# Patient Record
Sex: MALE | Race: WHITE
[De-identification: names, ages, dates, MRNs, and addresses within clinical notes are randomized per-mention and may not be internally consistent; named-entity substitution may affect disease eponyms.]

---
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
## 2018-03-25 ENCOUNTER — HOSPITAL ENCOUNTER (OUTPATIENT)
Dept: HOSPITAL 17 - NEPC | Age: 58
Setting detail: OBSERVATION
LOS: 1 days | Discharge: HOME | End: 2018-03-26
Attending: HOSPITALIST | Admitting: HOSPITALIST
Payer: COMMERCIAL

## 2018-03-25 VITALS
OXYGEN SATURATION: 98 % | SYSTOLIC BLOOD PRESSURE: 164 MMHG | TEMPERATURE: 97.7 F | HEART RATE: 84 BPM | DIASTOLIC BLOOD PRESSURE: 116 MMHG | RESPIRATION RATE: 18 BRPM

## 2018-03-25 VITALS
TEMPERATURE: 98.2 F | SYSTOLIC BLOOD PRESSURE: 133 MMHG | DIASTOLIC BLOOD PRESSURE: 80 MMHG | OXYGEN SATURATION: 98 % | HEART RATE: 84 BPM | RESPIRATION RATE: 20 BRPM

## 2018-03-25 VITALS
OXYGEN SATURATION: 99 % | SYSTOLIC BLOOD PRESSURE: 137 MMHG | RESPIRATION RATE: 19 BRPM | HEART RATE: 79 BPM | DIASTOLIC BLOOD PRESSURE: 72 MMHG

## 2018-03-25 VITALS
RESPIRATION RATE: 18 BRPM | DIASTOLIC BLOOD PRESSURE: 80 MMHG | HEART RATE: 75 BPM | TEMPERATURE: 98.1 F | SYSTOLIC BLOOD PRESSURE: 143 MMHG | OXYGEN SATURATION: 98 %

## 2018-03-25 VITALS
RESPIRATION RATE: 19 BRPM | DIASTOLIC BLOOD PRESSURE: 87 MMHG | OXYGEN SATURATION: 99 % | SYSTOLIC BLOOD PRESSURE: 139 MMHG | HEART RATE: 78 BPM

## 2018-03-25 VITALS — BODY MASS INDEX: 31.56 KG/M2 | WEIGHT: 220.46 LBS | HEIGHT: 70 IN

## 2018-03-25 DIAGNOSIS — Z79.899: ICD-10-CM

## 2018-03-25 DIAGNOSIS — R41.3: ICD-10-CM

## 2018-03-25 DIAGNOSIS — L03.112: ICD-10-CM

## 2018-03-25 DIAGNOSIS — E11.9: ICD-10-CM

## 2018-03-25 DIAGNOSIS — F17.200: ICD-10-CM

## 2018-03-25 DIAGNOSIS — L02.412: ICD-10-CM

## 2018-03-25 DIAGNOSIS — R94.31: ICD-10-CM

## 2018-03-25 DIAGNOSIS — R55: ICD-10-CM

## 2018-03-25 DIAGNOSIS — R07.89: Primary | ICD-10-CM

## 2018-03-25 DIAGNOSIS — M79.605: ICD-10-CM

## 2018-03-25 DIAGNOSIS — R42: ICD-10-CM

## 2018-03-25 DIAGNOSIS — F31.77: ICD-10-CM

## 2018-03-25 DIAGNOSIS — E78.00: ICD-10-CM

## 2018-03-25 DIAGNOSIS — F41.9: ICD-10-CM

## 2018-03-25 DIAGNOSIS — R06.02: ICD-10-CM

## 2018-03-25 DIAGNOSIS — Z79.82: ICD-10-CM

## 2018-03-25 DIAGNOSIS — R00.1: ICD-10-CM

## 2018-03-25 LAB
ALBUMIN SERPL-MCNC: 3.8 GM/DL (ref 3.4–5)
ALP SERPL-CCNC: 67 U/L (ref 45–117)
ALT SERPL-CCNC: 21 U/L (ref 12–78)
AST SERPL-CCNC: 23 U/L (ref 15–37)
BASOPHILS # BLD AUTO: 0 TH/MM3 (ref 0–0.2)
BASOPHILS NFR BLD: 0.3 % (ref 0–2)
BILIRUB SERPL-MCNC: 0.4 MG/DL (ref 0.2–1)
BUN SERPL-MCNC: 6 MG/DL (ref 7–18)
CALCIUM SERPL-MCNC: 9 MG/DL (ref 8.5–10.1)
CHLORIDE SERPL-SCNC: 100 MEQ/L (ref 98–107)
COLOR UR: (no result)
CREAT SERPL-MCNC: 0.98 MG/DL (ref 0.6–1.3)
EOSINOPHIL # BLD: 0.1 TH/MM3 (ref 0–0.4)
EOSINOPHIL NFR BLD: 1.5 % (ref 0–4)
ERYTHROCYTE [DISTWIDTH] IN BLOOD BY AUTOMATED COUNT: 14.4 % (ref 11.6–17.2)
GFR SERPLBLD BASED ON 1.73 SQ M-ARVRAT: 79 ML/MIN (ref 89–?)
GLUCOSE SERPL-MCNC: 107 MG/DL (ref 74–106)
GLUCOSE UR STRIP-MCNC: (no result) MG/DL
HCO3 BLD-SCNC: 28.7 MEQ/L (ref 21–32)
HCT VFR BLD CALC: 39.4 % (ref 39–51)
HGB BLD-MCNC: 13.4 GM/DL (ref 13–17)
HGB UR QL STRIP: (no result)
INR PPP: 1 RATIO
KETONES UR STRIP-MCNC: 10 MG/DL
LYMPHOCYTES # BLD AUTO: 1.8 TH/MM3 (ref 1–4.8)
LYMPHOCYTES NFR BLD AUTO: 21.4 % (ref 9–44)
MAGNESIUM SERPL-MCNC: 1.6 MG/DL (ref 1.5–2.5)
MCH RBC QN AUTO: 30.3 PG (ref 27–34)
MCHC RBC AUTO-ENTMCNC: 34.1 % (ref 32–36)
MCV RBC AUTO: 89.1 FL (ref 80–100)
MONOCYTE #: 0.7 TH/MM3 (ref 0–0.9)
MONOCYTES NFR BLD: 8 % (ref 0–8)
NEUTROPHILS # BLD AUTO: 5.8 TH/MM3 (ref 1.8–7.7)
NEUTROPHILS NFR BLD AUTO: 68.8 % (ref 16–70)
NITRITE UR QL STRIP: (no result)
PLATELET # BLD: 339 TH/MM3 (ref 150–450)
PMV BLD AUTO: 6.8 FL (ref 7–11)
PROT SERPL-MCNC: 7.2 GM/DL (ref 6.4–8.2)
PROTHROMBIN TIME: 10.2 SEC (ref 9.8–11.6)
RBC # BLD AUTO: 4.43 MIL/MM3 (ref 4.5–5.9)
SODIUM SERPL-SCNC: 137 MEQ/L (ref 136–145)
SP GR UR STRIP: 1 (ref 1–1.03)
TROPONIN I SERPL-MCNC: (no result) NG/ML (ref 0.02–0.05)
URINE LEUKOCYTE ESTERASE: (no result)
WBC # BLD AUTO: 8.4 TH/MM3 (ref 4–11)

## 2018-03-25 PROCEDURE — 83735 ASSAY OF MAGNESIUM: CPT

## 2018-03-25 PROCEDURE — 93005 ELECTROCARDIOGRAM TRACING: CPT

## 2018-03-25 PROCEDURE — 85610 PROTHROMBIN TIME: CPT

## 2018-03-25 PROCEDURE — 71046 X-RAY EXAM CHEST 2 VIEWS: CPT

## 2018-03-25 PROCEDURE — 95819 EEG AWAKE AND ASLEEP: CPT

## 2018-03-25 PROCEDURE — A9502 TC99M TETROFOSMIN: HCPCS

## 2018-03-25 PROCEDURE — 93880 EXTRACRANIAL BILAT STUDY: CPT

## 2018-03-25 PROCEDURE — 80048 BASIC METABOLIC PNL TOTAL CA: CPT

## 2018-03-25 PROCEDURE — 81001 URINALYSIS AUTO W/SCOPE: CPT

## 2018-03-25 PROCEDURE — 93017 CV STRESS TEST TRACING ONLY: CPT

## 2018-03-25 PROCEDURE — 80307 DRUG TEST PRSMV CHEM ANLYZR: CPT

## 2018-03-25 PROCEDURE — 70553 MRI BRAIN STEM W/O & W/DYE: CPT

## 2018-03-25 PROCEDURE — 80053 COMPREHEN METABOLIC PANEL: CPT

## 2018-03-25 PROCEDURE — 96374 THER/PROPH/DIAG INJ IV PUSH: CPT

## 2018-03-25 PROCEDURE — A9579 GAD-BASE MR CONTRAST NOS,1ML: HCPCS

## 2018-03-25 PROCEDURE — 99285 EMERGENCY DEPT VISIT HI MDM: CPT

## 2018-03-25 PROCEDURE — 82552 ASSAY OF CPK IN BLOOD: CPT

## 2018-03-25 PROCEDURE — 99284 EMERGENCY DEPT VISIT MOD MDM: CPT

## 2018-03-25 PROCEDURE — 85730 THROMBOPLASTIN TIME PARTIAL: CPT

## 2018-03-25 PROCEDURE — 82550 ASSAY OF CK (CPK): CPT

## 2018-03-25 PROCEDURE — 96372 THER/PROPH/DIAG INJ SC/IM: CPT

## 2018-03-25 PROCEDURE — 85025 COMPLETE CBC W/AUTO DIFF WBC: CPT

## 2018-03-25 PROCEDURE — 96376 TX/PRO/DX INJ SAME DRUG ADON: CPT

## 2018-03-25 PROCEDURE — 78452 HT MUSCLE IMAGE SPECT MULT: CPT

## 2018-03-25 PROCEDURE — 84484 ASSAY OF TROPONIN QUANT: CPT

## 2018-03-25 PROCEDURE — G0378 HOSPITAL OBSERVATION PER HR: HCPCS

## 2018-03-25 RX ADMIN — Medication SCH ML: at 22:03

## 2018-03-25 RX ADMIN — MORPHINE SULFATE PRN MG: 2 INJECTION, SOLUTION INTRAMUSCULAR; INTRAVENOUS at 22:03

## 2018-03-25 RX ADMIN — MORPHINE SULFATE PRN MG: 2 INJECTION, SOLUTION INTRAMUSCULAR; INTRAVENOUS at 13:52

## 2018-03-25 RX ADMIN — MORPHINE SULFATE PRN MG: 2 INJECTION, SOLUTION INTRAMUSCULAR; INTRAVENOUS at 16:54

## 2018-03-25 RX ADMIN — ENOXAPARIN SODIUM SCH MG: 40 INJECTION SUBCUTANEOUS at 13:52
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
## 2018-03-25 NOTE — EKG
Date Performed: 03/25/2018       Time Performed: 10:21:44

 

PTAGE:      57 years

 

EKG:      Sinus rhythm 

 

 MARKED LEFT AXIS DEVIATION Since the previous tracing, no significant change noted ABNORMAL ECG

 

PREVIOUS TRACING       : 11/04/2016 07.49

 

DOCTOR:   Felix Li  Interpretating Date/Time  03/25/2018 19:45:51
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
## 2018-03-25 NOTE — RADRPT
EXAM DATE/TIME:  03/25/2018 10:56 

 

[facility] COMPARISON:     

No previous studies available for comparison.

 

                     

INDICATIONS :     

Shortness of breath. Mid chest pain. Syncope.

                     

 

MEDICAL HISTORY :            

Diabetes.   

 

SURGICAL HISTORY :     

None.   

 

ENCOUNTER:     

Initial                                        

 

ACUITY:     

3 days      

 

PAIN SCORE:     

7/10

 

LOCATION:     

Bilateral chest 

 

FINDINGS:     

There is a poor inspiratory result. No focal infiltrate is noted. No pulmonary vascular congestion is
 noted. The heart is normal.

 

CONCLUSION:     

No acute cardiopulmonary disease. Poor inspiratory result.

 

 

 

 Esteban Huggins MD on March 25, 2018 at 11:27           

Board Certified Radiologist.

 This report was verified electronically.

## 2018-03-26 ENCOUNTER — HOSPITAL ENCOUNTER (EMERGENCY)
Dept: HOSPITAL 17 - NED | Age: 58
LOS: 1 days | Discharge: HOME | End: 2018-03-27
Payer: MEDICARE

## 2018-03-26 VITALS
RESPIRATION RATE: 20 BRPM | SYSTOLIC BLOOD PRESSURE: 111 MMHG | TEMPERATURE: 98 F | OXYGEN SATURATION: 100 % | DIASTOLIC BLOOD PRESSURE: 66 MMHG | HEART RATE: 61 BPM

## 2018-03-26 VITALS
TEMPERATURE: 98.1 F | HEART RATE: 65 BPM | OXYGEN SATURATION: 96 % | SYSTOLIC BLOOD PRESSURE: 124 MMHG | RESPIRATION RATE: 18 BRPM | DIASTOLIC BLOOD PRESSURE: 74 MMHG

## 2018-03-26 VITALS
DIASTOLIC BLOOD PRESSURE: 60 MMHG | OXYGEN SATURATION: 98 % | TEMPERATURE: 98.7 F | SYSTOLIC BLOOD PRESSURE: 127 MMHG | HEART RATE: 70 BPM | RESPIRATION RATE: 16 BRPM

## 2018-03-26 VITALS
TEMPERATURE: 98.2 F | HEART RATE: 66 BPM | OXYGEN SATURATION: 93 % | RESPIRATION RATE: 18 BRPM | SYSTOLIC BLOOD PRESSURE: 125 MMHG | DIASTOLIC BLOOD PRESSURE: 69 MMHG

## 2018-03-26 VITALS — BODY MASS INDEX: 30.13 KG/M2 | WEIGHT: 210.43 LBS | HEIGHT: 70 IN

## 2018-03-26 VITALS
OXYGEN SATURATION: 97 % | RESPIRATION RATE: 16 BRPM | TEMPERATURE: 97.5 F | DIASTOLIC BLOOD PRESSURE: 65 MMHG | HEART RATE: 58 BPM | SYSTOLIC BLOOD PRESSURE: 131 MMHG

## 2018-03-26 VITALS — HEART RATE: 64 BPM

## 2018-03-26 VITALS — HEART RATE: 65 BPM

## 2018-03-26 DIAGNOSIS — R00.1: ICD-10-CM

## 2018-03-26 DIAGNOSIS — F41.9: ICD-10-CM

## 2018-03-26 DIAGNOSIS — R42: Primary | ICD-10-CM

## 2018-03-26 DIAGNOSIS — Z79.899: ICD-10-CM

## 2018-03-26 DIAGNOSIS — F31.9: ICD-10-CM

## 2018-03-26 DIAGNOSIS — Z72.0: ICD-10-CM

## 2018-03-26 LAB
ALBUMIN SERPL-MCNC: 3.3 GM/DL (ref 3.4–5)
ALP SERPL-CCNC: 58 U/L (ref 45–117)
ALT SERPL-CCNC: 20 U/L (ref 12–78)
AST SERPL-CCNC: 19 U/L (ref 15–37)
BASOPHILS # BLD AUTO: 0 TH/MM3 (ref 0–0.2)
BASOPHILS # BLD AUTO: 0.1 TH/MM3 (ref 0–0.2)
BASOPHILS NFR BLD: 0.5 % (ref 0–2)
BASOPHILS NFR BLD: 0.9 % (ref 0–2)
BILIRUB SERPL-MCNC: 0.3 MG/DL (ref 0.2–1)
BUN SERPL-MCNC: 13 MG/DL (ref 7–18)
BUN SERPL-MCNC: 9 MG/DL (ref 7–18)
CALCIUM SERPL-MCNC: 8.3 MG/DL (ref 8.5–10.1)
CALCIUM SERPL-MCNC: 8.6 MG/DL (ref 8.5–10.1)
CHLORIDE SERPL-SCNC: 104 MEQ/L (ref 98–107)
CHLORIDE SERPL-SCNC: 104 MEQ/L (ref 98–107)
CREAT SERPL-MCNC: 1.21 MG/DL (ref 0.6–1.3)
CREAT SERPL-MCNC: 1.43 MG/DL (ref 0.6–1.3)
EOSINOPHIL # BLD: 0.4 TH/MM3 (ref 0–0.4)
EOSINOPHIL # BLD: 0.4 TH/MM3 (ref 0–0.4)
EOSINOPHIL NFR BLD: 6.4 % (ref 0–4)
EOSINOPHIL NFR BLD: 6.7 % (ref 0–4)
ERYTHROCYTE [DISTWIDTH] IN BLOOD BY AUTOMATED COUNT: 14.2 % (ref 11.6–17.2)
ERYTHROCYTE [DISTWIDTH] IN BLOOD BY AUTOMATED COUNT: 14.8 % (ref 11.6–17.2)
GFR SERPLBLD BASED ON 1.73 SQ M-ARVRAT: 51 ML/MIN (ref 89–?)
GFR SERPLBLD BASED ON 1.73 SQ M-ARVRAT: 62 ML/MIN (ref 89–?)
GLUCOSE SERPL-MCNC: 100 MG/DL (ref 74–106)
GLUCOSE SERPL-MCNC: 99 MG/DL (ref 74–106)
HCO3 BLD-SCNC: 26.9 MEQ/L (ref 21–32)
HCO3 BLD-SCNC: 28.2 MEQ/L (ref 21–32)
HCT VFR BLD CALC: 37.8 % (ref 39–51)
HCT VFR BLD CALC: 38.4 % (ref 39–51)
HGB BLD-MCNC: 12.8 GM/DL (ref 13–17)
HGB BLD-MCNC: 12.9 GM/DL (ref 13–17)
LYMPHOCYTES # BLD AUTO: 2 TH/MM3 (ref 1–4.8)
LYMPHOCYTES # BLD AUTO: 2.2 TH/MM3 (ref 1–4.8)
LYMPHOCYTES NFR BLD AUTO: 34.1 % (ref 9–44)
LYMPHOCYTES NFR BLD AUTO: 37.1 % (ref 9–44)
MCH RBC QN AUTO: 29.9 PG (ref 27–34)
MCH RBC QN AUTO: 30.4 PG (ref 27–34)
MCHC RBC AUTO-ENTMCNC: 33.5 % (ref 32–36)
MCHC RBC AUTO-ENTMCNC: 33.8 % (ref 32–36)
MCV RBC AUTO: 89.2 FL (ref 80–100)
MCV RBC AUTO: 90 FL (ref 80–100)
MONOCYTE #: 0.5 TH/MM3 (ref 0–0.9)
MONOCYTE #: 0.6 TH/MM3 (ref 0–0.9)
MONOCYTES NFR BLD: 8.7 % (ref 0–8)
MONOCYTES NFR BLD: 9.6 % (ref 0–8)
NEUTROPHILS # BLD AUTO: 2.8 TH/MM3 (ref 1.8–7.7)
NEUTROPHILS # BLD AUTO: 3 TH/MM3 (ref 1.8–7.7)
NEUTROPHILS NFR BLD AUTO: 46.9 % (ref 16–70)
NEUTROPHILS NFR BLD AUTO: 49.1 % (ref 16–70)
PLATELET # BLD: 315 TH/MM3 (ref 150–450)
PLATELET # BLD: 321 TH/MM3 (ref 150–450)
PMV BLD AUTO: 6.5 FL (ref 7–11)
PMV BLD AUTO: 6.8 FL (ref 7–11)
PROT SERPL-MCNC: 6.7 GM/DL (ref 6.4–8.2)
RBC # BLD AUTO: 4.2 MIL/MM3 (ref 4.5–5.9)
RBC # BLD AUTO: 4.31 MIL/MM3 (ref 4.5–5.9)
SODIUM SERPL-SCNC: 139 MEQ/L (ref 136–145)
SODIUM SERPL-SCNC: 140 MEQ/L (ref 136–145)
TROPONIN I SERPL-MCNC: (no result) NG/ML (ref 0.02–0.05)
WBC # BLD AUTO: 5.9 TH/MM3 (ref 4–11)
WBC # BLD AUTO: 6 TH/MM3 (ref 4–11)

## 2018-03-26 PROCEDURE — 80048 BASIC METABOLIC PNL TOTAL CA: CPT

## 2018-03-26 PROCEDURE — 99284 EMERGENCY DEPT VISIT MOD MDM: CPT

## 2018-03-26 PROCEDURE — 85025 COMPLETE CBC W/AUTO DIFF WBC: CPT

## 2018-03-26 PROCEDURE — 93005 ELECTROCARDIOGRAM TRACING: CPT

## 2018-03-26 RX ADMIN — MORPHINE SULFATE PRN MG: 2 INJECTION, SOLUTION INTRAMUSCULAR; INTRAVENOUS at 16:49

## 2018-03-26 RX ADMIN — ENOXAPARIN SODIUM SCH MG: 40 INJECTION SUBCUTANEOUS at 16:48

## 2018-03-26 RX ADMIN — Medication SCH ML: at 09:00

## 2018-03-26 RX ADMIN — MORPHINE SULFATE PRN MG: 2 INJECTION, SOLUTION INTRAMUSCULAR; INTRAVENOUS at 12:23

## 2018-03-26 RX ADMIN — MORPHINE SULFATE PRN MG: 2 INJECTION, SOLUTION INTRAMUSCULAR; INTRAVENOUS at 08:56

## 2018-03-26 NOTE — RADRPT
EXAM DATE/TIME:  03/26/2018 13:10 

 

[facility] COMPARISON:     

No previous studies available for comparison.

       

 

 

INDICATIONS :     

Cephalgia.

                     

 

CONTRAST:     

22 cc Omniscan (gadodiamide) IV

                     

 

MEDICAL HISTORY :     

Hypercholesterolemia.     

 

SURGICAL HISTORY :     

Appendectomy.     wrist and knee surgery

 

ENCOUNTER:     

Subsequent

 

ACUITY:     

2 day

 

PAIN SCORE:     

3/10

 

LOCATION:       

cranial 

 

TECHNIQUE:     

Multiplanar, multisequence MRI of the brain was performed both prior to and following the administrat
ion of paramagnetic contrast.

 

FINDINGS:     

 

CEREBRUM:     

The ventricles are normal for age.  No evidence of midline shift, mass lesion, hemorrhage or acute in
farction.  No extraaxial fluid collections are seen.  The pituitary gland and suprasellar cistern are
 normal in configuration.

 

WHITE MATTER:     

No significant signal abnormalities are seen in the white matter.

 

POSTERIOR FOSSA:     

The cerebellum and brainstem are intact.  The 4th ventricle is midline. The cerebellopontine angle is
 unremarkable.  The cerebellar tonsils are normal in position.

 

DIFFUSION IMAGING:     

No focal areas of restricted diffusion are seen.  No evidence of acute infarction.

 

EXTRACRANIAL:     

The visualized portions of the orbits and paranasal sinuses are unremarkable.

 

POST-CONTRAST:     

No abnormal areas of parenchymal or dural enhancement.  No evidence of blood-brain barrier breakdown.


 

CONCLUSION:     

Unremarkable exam for age with no evidence of hemorrhage, mass or infarction.

 

 

 

 Abraham Herrera MD on March 26, 2018 at 13:45           

Board Certified Radiologist.

 This report was verified electronically.
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
## 2018-03-26 NOTE — RADRPT
EXAM DATE/TIME:  03/26/2018 14:35 

 

[facility] COMPARISON:     

No previous studies available for comparison.

 

 

INDICATIONS :     

Substernal chest pain radiating down left leg with dizziness and dyspnea. Abnormal EKG. 

                           

 

DOSE:     

31.2 mCi Tc99m Myoview at stress.

                     10.1 mCi Tc99m Myoview at rest.

                     0.4 mg Lexiscan

                       

 

 

STRESS SYMPTOMS:      

Chest pain and dyspnea.

                       

 

 

EJECTION FRACTION:       

54%

                       

 

MEDICAL HISTORY :     

Hypertension.  Hypercholesterolemia. Renal disease. Smoker.

 

SURGICAL HISTORY :       

Appendectomy.  

 

ENCOUNTER:     

Initial

 

ACUITY:     

3 days

 

PAIN SCALE:     

7/10

 

LOCATION:      

Substernal chest 

 

TECHNIQUE:     

The patient underwent pharmacologic stress with infusion of prescribed dose.  Continuous ECG tracing 
was monitored during stress.  Gated SPECT imaging was performed after stress and conventional SPECT i
maging was performed at rest.  The examination was performed on a SPECT/CT scanner, both attenuation 
and non-corrected datasets were reviewed.

 

FINDINGS:     

 

DISTRIBUTION:     

The maximum perfused segment at stress is in the anterior lateral wall.

 

PERFUSION STUDY:     

The pattern of perfusion at stress is within normal limits.

 

GATED STUDY:     

There is intact wall motion and thickening without hypokinetic or dyskinetic segments. 

 

CONCLUSION:     

1. Normal wall motion with calculated ejection fraction.

2. No fixed or reversible wall defects to suggest ischemia or infarction.

 

RISK CATEGORY:     Low (<1% Annual Mortality Rate)

 

 

 

 

 Abraham Herrera MD on March 26, 2018 at 16:43           

Board Certified Radiologist.

 This report was verified electronically.

## 2018-03-26 NOTE — PD
HPI


Chief Complaint:  Medical Clearance


Time Seen by Provider:  22:31


Travel History


International Travel<30 days:  No


Contact w/Intl Traveler<30days:  No


Traveled to known affect area:  No





History of Present Illness


HPI


57-year-old white male returns to the ER after being discharged in the past 

hour or 2 from inpatient after a evaluation of chest pain, headache and 

dizziness.  He has had a nuclear stress test, MRI of the head and neck 

including carotid ultrasound.  Patient had been medically cleared and 

discharged.  He states that he came back to the ER because he did not feel 

comfortable going home.  He states that he still has intermittent headache, 

dizziness in the sensation he is going to pass out.  He claims that he has had 

a history of mental health as well as head injuries.  He has been staying in 

hotel rooms.  He states that he moved from [place] here recently looking 

for work.  He is disabled due to mental health.  Patient denies any active 

chest pain.  No shortness of breath, nausea, vomiting.  He does state that his 

symptoms appear to be worse when he stands and moves.  He is currently on 

antibiotics for left axilla abscess.  He had a recent incision and drainage 

performed.





PFSH


Past Medical History


Asthma:  No


Bipolar Disorder:  Yes


Anxiety:  Yes


Depression:  Yes


Heart Rhythm Problems:  Yes ("murmur or something like that")


Cancer:  No


Cardiovascular Problems:  Yes


High Cholesterol:  No


Chest Pain:  Yes


Congestive Heart Failure:  No


COPD:  No


Diabetes:  No


Diminished Hearing:  No


Endocrine:  No


Genitourinary:  No


Musculoskeletal:  No


Neurologic:  Yes (multiple head injuries, concussion, bipolar, migraines)


Psychiatric:  Yes (bipolar)


Reproductive:  No


Respiratory:  Yes (bad allergies in [place], currently congested)


Immunizations Current:  Yes


Sleep Apnea:  Yes


Thyroid Disease:  No


Tetanus Vaccination:  < 5 Years


Influenza Vaccination:  No





Past Surgical History


Appendectomy:  Yes


Other Surgery:  Yes (I & D of abcess, appe, wrist sx, knee sx)





Social History


Alcohol Use:  Yes


Tobacco Use:  Yes


Substance Use:  Yes (norco)





Allergies-Medications


(Allergen,Severity, Reaction):  


Coded Allergies:  


     No Known Allergies (Unverified  Allergy, Unknown, 3/25/18)


Reported Meds & Prescriptions





Reported Meds & Active Scripts


Active


Sulfamethoxazole-Trimethoprim 800-160 Mg Tab 1 Tab PO Q12HR 14 Days


Lyrica (Pregabalin) 25 Mg Cap 50 Mg PO DAILY 30 Days


Reported


Trazodone (Trazodone HCl) 100 Mg Tablet 100 Mg PO HS


Ropinirole 0.25 Mg Tab 0.25 Mg PO HS


Risperdal (Risperidone) 2 Mg Tab 2 Mg PO DAILY


Ambien (Zolpidem Tartrate) 10 Mg Tab 10 Mg PO HS PRN


Nortriptyline (Nortriptyline HCl) 50 Mg Cap 100 Mg PO HS








Review of Systems


Except as stated in HPI:  all other systems reviewed are Neg





Physical Exam


Narrative


GENERAL:  Well-developed, well-nourished in no apparent distress. Nontoxic 

appearing.  The patient is resting comfortable in the examination room sleeping.


HEAD: Normocephalic, atraumatic.


EYES: Pupils equal round and reactive. Extraocular motions intact. No scleral 

icterus. No injection or drainage. 


ENT: Nose clear. Throat without erythema, tonsillar hypertrophy or exudate. 

Uvula midline. Airway patent.


NECK: Trachea midline. Supple, nontender, moves head freely.  No central bony 

tenderness or spasm.


CARDIOVASCULAR: Regular rate and rhythm without murmurs, gallops, or rubs. 


RESPIRATORY: Clear to auscultation. Breath sounds equal bilaterally. No wheezes

, rales, or rhonchi.  


GASTROINTESTINAL: Abdomen soft, non-tender, nondistended. No hepato-splenomegaly

, or palpable masses. No guarding.


EXTREMITIES: No clubbing, cyanosis, or edema. No joint tenderness.


BACK: Nontender without deformity. No flank tenderness.


NEUROLOGICAL: Awake, alert and oriented x 3 .Cranial nerves grossly intact.  

Motor and sensory grossly within normal limits. Normal speech.





Data


Data


Last Documented VS





Vital Signs








  Date Time  Temp Pulse Resp B/P (MAP) Pulse Ox O2 Delivery O2 Flow Rate FiO2


 


3/26/18 20:33 98.7 70 16 127/60 (82) 98   








Orders





 Orders


Complete Blood Count With Diff (3/26/18 20:37)


Basic Metabolic Panel (Bmp) (3/26/18 20:37)


Electrocardiogram (3/26/18 22:45)


Ed Discharge Order (3/26/18 23:35)





Labs





Laboratory Tests








Test


  3/26/18


23:00


 


White Blood Count 5.9 TH/MM3 


 


Red Blood Count 4.31 MIL/MM3 


 


Hemoglobin 12.9 GM/DL 


 


Hematocrit 38.4 % 


 


Mean Corpuscular Volume 89.2 FL 


 


Mean Corpuscular Hemoglobin 29.9 PG 


 


Mean Corpuscular Hemoglobin


Concent 33.5 % 


 


 


Red Cell Distribution Width 14.8 % 


 


Platelet Count 321 TH/MM3 


 


Mean Platelet Volume 6.5 FL 


 


Neutrophils (%) (Auto) 46.9 % 


 


Lymphocytes (%) (Auto) 37.1 % 


 


Monocytes (%) (Auto) 8.7 % 


 


Eosinophils (%) (Auto) 6.4 % 


 


Basophils (%) (Auto) 0.9 % 


 


Neutrophils # (Auto) 2.8 TH/MM3 


 


Lymphocytes # (Auto) 2.2 TH/MM3 


 


Monocytes # (Auto) 0.5 TH/MM3 


 


Eosinophils # (Auto) 0.4 TH/MM3 


 


Basophils # (Auto) 0.1 TH/MM3 


 


CBC Comment DIFF FINAL 


 


Differential Comment  


 


Blood Urea Nitrogen 13 MG/DL 


 


Creatinine 1.43 MG/DL 


 


Random Glucose 99 MG/DL 


 


Calcium Level 8.3 MG/DL 


 


Sodium Level 139 MEQ/L 


 


Potassium Level 4.3 MEQ/L 


 


Chloride Level 104 MEQ/L 


 


Carbon Dioxide Level 28.2 MEQ/L 


 


Anion Gap 7 MEQ/L 


 


Estimat Glomerular Filtration


Rate 51 ML/MIN 


 











[facility]


Medical Decision Making


Medical Screen Exam Complete:  Yes


Emergency Medical Condition:  Yes


Medical Record Reviewed:  Yes


Interpretation(s)





Laboratory Tests








Test


  3/26/18


23:00


 


White Blood Count 5.9 TH/MM3 


 


Red Blood Count 4.31 MIL/MM3 


 


Hemoglobin 12.9 GM/DL 


 


Hematocrit 38.4 % 


 


Mean Corpuscular Volume 89.2 FL 


 


Mean Corpuscular Hemoglobin 29.9 PG 


 


Mean Corpuscular Hemoglobin


Concent 33.5 % 


 


 


Red Cell Distribution Width 14.8 % 


 


Platelet Count 321 TH/MM3 


 


Mean Platelet Volume 6.5 FL 


 


Neutrophils (%) (Auto) 46.9 % 


 


Lymphocytes (%) (Auto) 37.1 % 


 


Monocytes (%) (Auto) 8.7 % 


 


Eosinophils (%) (Auto) 6.4 % 


 


Basophils (%) (Auto) 0.9 % 


 


Neutrophils # (Auto) 2.8 TH/MM3 


 


Lymphocytes # (Auto) 2.2 TH/MM3 


 


Monocytes # (Auto) 0.5 TH/MM3 


 


Eosinophils # (Auto) 0.4 TH/MM3 


 


Basophils # (Auto) 0.1 TH/MM3 


 


CBC Comment DIFF FINAL 


 


Differential Comment  


 


Blood Urea Nitrogen 13 MG/DL 


 


Creatinine 1.43 MG/DL 


 


Random Glucose 99 MG/DL 


 


Calcium Level 8.3 MG/DL 


 


Sodium Level 139 MEQ/L 


 


Potassium Level 4.3 MEQ/L 


 


Chloride Level 104 MEQ/L 


 


Carbon Dioxide Level 28.2 MEQ/L 


 


Anion Gap 7 MEQ/L 


 


Estimat Glomerular Filtration


Rate 51 ML/MIN 


 








Differential Diagnosis


Differential diagnosis: Bipolar, arrhythmia, electrolyte abnormality, substance 

abuse, malingering, chronic headache, posttraumatic stress disorder


Narrative Course


I reviewed the patient's medical record from his prior admissions as well as 

his imaging studies.  I reviewed his laboratory tests from today.  He is 

resting comfortable in examination room.  Patient is sleeping.  I see no 

emergent reason to readmit the patient today.  He is advised to follow-up as an 

outpatient with a primary care doctor as well as a neurologist.  Is given 

neurology on call.





This is dizziness





Diagnosis





 Primary Impression:  


 Dizziness


Referrals:  


Ned Saldaña MD PhD


1 week





[facility]


2 days


Patient Instructions:  General Instructions





***Additional Instructions:  


Rest.


Increase fluids.


Meclizine.


Follow-up with a primary care doctor next 2-3 days for recheck.


Follow-up with a neurologist within 1 week.


***Med/Other Pt SpecificInfo:  Prescription(s) given


Disposition:  01 DISCHARGE HOME


Condition:  Stable











Greg Tyler Mar 26, 2018 22:51

## 2018-03-26 NOTE — MG
cc:

Ned Saldaña MD, PhD

****

 

 

TEST NUMBER:



 

TECHNIQUE:

A 17-channel EEG.

 

DESCRIPTION:

The background rhythm reveals a symmetrical alpha rhythm, frequency 

8-9 Hz.  Amplitude is about 20 microvolts.  During drowsiness, there 

is slowing in the theta range.  Occasional muscle artifact is 

identified.  No lateralizing features are seen.  The patient does fall

asleep and normal sleep spindles and K complexes are seen.  Photic 

results in a fairly well-developed driving response.

 

INTERPRETATION:

This is a normal electroencephalogram.

 

 

__________________________________

Ned Saldaña MD, PhD

 

 

SOHAN/SB

D: 03/26/2018, 04:27 PM

T: 03/26/2018, 04:38 PM

Visit #: [id number]

Job #: [id number]

## 2018-03-26 NOTE — HHI.PR
Subjective


Remarks





Patient reports that chest pain is about the same.  Denies any nausea or 

vomiting.  Denies any lightheadedness or dizziness.  Requesting morphine for 

pain.





Objective





Vital Signs








  Date Time  Temp Pulse Resp B/P (MAP) Pulse Ox O2 Delivery O2 Flow Rate FiO2


 


3/26/18 12:02 97.5 58 16 131/65 (87) 97   


 


3/26/18 08:45  65      


 


3/26/18 08:07 98.0 61 20 111/66 (81) 100   


 


3/26/18 04:35  64      


 


3/26/18 03:36 98.1 65 18 124/74 (91) 96   


 


3/26/18 00:09 98.2 66 18 125/69 (87) 93   


 


3/25/18 21:09 98.1 75 18 143/80 (101) 98   














I/O      


 


 3/25/18 3/25/18 3/25/18 3/26/18 3/26/18 3/26/18





 07:00 15:00 23:00 07:00 15:00 23:00


 


Output Total    650 ml  


 


Balance    -650 ml  


 


      


 


Output Urine Total    650 ml  


 


# Voids    2  








Result Diagram:  


3/26/18 0438                                                                   

             3/26/18 0435





Objective Remarks


GENERAL: Patient sitting up in chair appears comfortable.


SKIN: Warm and dry.  Left axilla with purulence, minimal surrounding erythema.  

Appears to be improving.  No surrounding erythema.


HEAD: Normocephalic.


EYES: No scleral icterus. No injection or drainage. 


NECK: Supple, trachea midline. No JVD or lymphadenopathy.


CARDIOVASCULAR: Regular rate and rhythm without murmurs, gallops, or rubs. 


RESPIRATORY: Breath sounds equal bilaterally. No accessory muscle use.


GASTROINTESTINAL: Abdomen soft, non-tender, nondistended. 


MUSCULOSKELETAL: No cyanosis, or edema. 


BACK: Nontender without obvious deformity. No CVA tenderness.








A/P


Assessment and Plan


57-year-old male admitted secondary to chest pain with reports of syncope, 

amnesia, and left axilla infection.





//Chest pain


Evaluate for ACS


Follow cardiac enzymes


Aspirin daily


When necessary oxygen


When necessary morphine for pain.


When necessary nitroglycerin


Follow on telemetry


Plan for Lexiscan in a.m. if workup is negative


Cardiology consult if Evelin scan is positive


= Lexiscan negative





//Syncope


//Amnesia


History of concussions and postconcussive syndrome


Check carotid ultrasound


Follow on telemetry


Etiology may be related to recurrent history of concussions


Neurology consulted


= EEG, MRI negative for acute process





//Left axilla cellulitis


Status post left axilla abscess drainage


Patient did not take antibiotics as instructed 5 days ago


Start Bactrim


= Continue Bactrim to complete treatment course.  Follow-up primary care.





DVT prophylaxis


Lovenox


Discharge Planning


Discharge home in good condition.  Continue heart healthy diet.  Activity ad 

khurram.  Please see discharge medications for medication list.  Follow-up with 

primary care











Shahram Scruggs MD Mar 26, 2018 17:10

## 2018-03-27 ENCOUNTER — HOSPITAL ENCOUNTER (EMERGENCY)
Dept: HOSPITAL 17 - NEPD | Age: 58
Discharge: HOME | End: 2018-03-27
Payer: MEDICARE

## 2018-03-27 VITALS
HEART RATE: 63 BPM | TEMPERATURE: 97.6 F | SYSTOLIC BLOOD PRESSURE: 123 MMHG | OXYGEN SATURATION: 99 % | RESPIRATION RATE: 16 BRPM | DIASTOLIC BLOOD PRESSURE: 71 MMHG

## 2018-03-27 VITALS — WEIGHT: 222.89 LBS | BODY MASS INDEX: 31.91 KG/M2 | HEIGHT: 70 IN

## 2018-03-27 DIAGNOSIS — F31.9: Primary | ICD-10-CM

## 2018-03-27 DIAGNOSIS — F41.9: ICD-10-CM

## 2018-03-27 DIAGNOSIS — Z79.899: ICD-10-CM

## 2018-03-27 DIAGNOSIS — Z72.0: ICD-10-CM

## 2018-03-27 DIAGNOSIS — G47.30: ICD-10-CM

## 2018-03-27 DIAGNOSIS — R07.9: ICD-10-CM

## 2018-03-27 DIAGNOSIS — R42: ICD-10-CM

## 2018-03-27 PROCEDURE — 99283 EMERGENCY DEPT VISIT LOW MDM: CPT

## 2018-03-27 NOTE — EKG
Date Performed: 03/25/2018       Time Performed: 17:19:10

 

PTAGE:      57 years

 

EKG:      Sinus rhythm 

 

 INFERIOR MYOCARDIAL INFARCTION ABNORMAL ECG

 

PREVIOUS TRACING       : 03/25/2018 10.21

 

DOCTOR:   Moises Garner  Interpretating Date/Time  03/27/2018 00:13:06

## 2018-03-27 NOTE — PD
HPI


Chief Complaint:  Psychiatric Symptoms


Time Seen by Provider:  03:32


Travel History


International Travel<30 days:  No


Contact w/Intl Traveler<30days:  No


Traveled to known affect area:  No





History of Present Illness


HPI


57-year-old white male return to the ER after being seen in the ER earlier this 

evening by myself.  He was discharged prior to my evaluation this evening after 

being admitted for chest pain and dizziness.  He had an extensive workup 

including MRI, stress test and ultrasound.  The patient returns a third time 

stating that he is having exacerbation of his PTSD because he does not feel it 

is been treated appropriately.  Patient states that he is not homicidal or 

suicidal.  Patient states that he would like to talk to a psychiatrist.  He 

does not feel comfortable discussing his issues with this examiner.





PFSH


Past Medical History


Asthma:  No


Bipolar Disorder:  Yes


Anxiety:  Yes


Depression:  Yes


Heart Rhythm Problems:  Yes ("murmur or something like that")


Cancer:  No


Cardiovascular Problems:  Yes


High Cholesterol:  No


Chest Pain:  Yes


Congestive Heart Failure:  No


COPD:  No


Diabetes:  No


Diminished Hearing:  No


Endocrine:  No


Genitourinary:  No


Musculoskeletal:  No


Neurologic:  Yes (multiple head injuries, concussion, bipolar, migraines)


Psychiatric:  Yes (bipolar)


Reproductive:  No


Respiratory:  Yes (bad allergies in [place], currently congested)


Immunizations Current:  Yes


Sleep Apnea:  Yes


Thyroid Disease:  No





Past Surgical History


Appendectomy:  Yes


Other Surgery:  Yes (I & D of abcess, appe, wrist sx, knee sx)





Social History


Alcohol Use:  Yes


Tobacco Use:  Yes


Substance Use:  Yes (norco)





Allergies-Medications


(Allergen,Severity, Reaction):  


Coded Allergies:  


     No Known Allergies (Unverified  Allergy, Unknown, 3/25/18)


Reported Meds & Prescriptions





Reported Meds & Active Scripts


Active


Meclizine (Meclizine HCl) 25 Mg Tab 25 Mg PO QID PRN


Sulfamethoxazole-Trimethoprim 800-160 Mg Tab 1 Tab PO Q12HR 14 Days


Lyrica (Pregabalin) 25 Mg Cap 50 Mg PO DAILY 30 Days


Reported


Trazodone (Trazodone HCl) 100 Mg Tablet 100 Mg PO HS


Ropinirole 0.25 Mg Tab 0.25 Mg PO HS


Risperdal (Risperidone) 2 Mg Tab 2 Mg PO DAILY


Ambien (Zolpidem Tartrate) 10 Mg Tab 10 Mg PO HS PRN


Nortriptyline (Nortriptyline HCl) 50 Mg Cap 100 Mg PO HS








Review of Systems


General / Constitutional:  No: Fever


Eyes:  No: Visual changes


HENT:  Positive: Headaches


Cardiovascular:  Positive: Chest Pain or Discomfort


Respiratory:  No: Shortness of Breath


Gastrointestinal:  No: Abdominal Pain


Genitourinary:  No: Dysuria


Musculoskeletal:  No: Pain


Skin:  No Rash


Neurologic:  Positive: Dizziness, Headache, No: Weakness, Paresthesia, Seizures


Psychiatric:  Positive: Anxiety, Depression, Substance Abuse, No: Suicidal 

Ideations, Disorder of Thought, Homicidal Ideation


Endocrine:  No: Polydipsia


Hematologic/Lymphatic:  No: Easy Bruising





Physical Exam


Narrative


GENERAL: Well-nourished, well-developed patient.


SKIN: Warm and dry.


HEAD: Normocephalic and atraumatic.


EYES: No scleral icterus. No injection or drainage. 


ENT: No nasal drainage noted. Mucous membranes pink. Airway patent.


NECK: Supple, trachea midline.  Moves head freely without obvious discomfort.


CARDIOVASCULAR: Regular rate and rhythm without murmurs, gallops, or rubs. 


RESPIRATORY: Breath sounds equal bilaterally. No accessory muscle use.


GASTROINTESTINAL: Abdomen soft, non-tender, nondistended. 


EXTREMITIES: No cyanosis or edema.


BACK: Nontender without obvious deformity. No CVA tenderness.


NEURO: Patient is alert and oriented. no sensorimotor deficits.  Nonfocal.  

Normal speech.


PSYCH: No delusions.  No auditory or visual hallucinations.





Data


Data


Last Documented VS





Vital Signs








  Date Time  Temp Pulse Resp B/P (MAP) Pulse Ox O2 Delivery O2 Flow Rate FiO2


 


3/27/18 02:03 97.6 63 16 123/71 (88) 99 Room Air  








Orders





 Orders


Psych Screen (3/27/18 03:39)


Ed Discharge Order (3/27/18 05:28)








MDM


Medical Decision Making


Medical Screen Exam Complete:  Yes


Emergency Medical Condition:  Yes


Medical Record Reviewed:  Yes


Differential Diagnosis


MDM: High


Differential diagnoses: Schizophrenia, schizoaffective disorder, bipolar, 

anxiety, depression, adjustment reaction, mood disorder NOS, ODD, depressive 

disorder NOS, dementia, dementia with agitation, psychosis NOS, substance 

induced mood disorder, DMDD, Asperger syndrome, infection,electrolyte 

abnormality, malingering.


Narrative Course


Mental health screening discussed with the patient.  Psychiatric screen ordered.





The patient has been medically cleared.





The patient has been evaluated by the psych screener.  The patient has denied 

any suicidal homicidal ideation.  He states that he has bipolar disorder and 

would like to be back on his medications.  He is given outpatient treatment 

information and is considered stable to be discharged.  He does not meet 

criteria for inpatient management.  There is no indication for involuntary 

admission.





This is medical clearance for psychiatric admission, Bipolar





Diagnosis





 Primary Impression:  


 Medical clearance for psychiatric admission


Referrals:  


[facility]


1 day





[facility]


1 day


Patient Instructions:  General Instructions





***Additional Instructions:  


Rest.


Follow-up with the recommendations of the psych screener.


Return to the ER for emergencies


Disposition:  01 DISCHARGE HOME


Condition:  Stable











Greg Tyler Mar 27, 2018 03:45

## 2018-03-27 NOTE — EKG
Date Performed: 03/26/2018       Time Performed: 23:51:17

 

PTAGE:      57 years

 

EKG:      SINUS BRADYCARDIA POSSIBLE LATERAL MYOCARDIAL INFARCTION ABNORMAL ECG

 

PREVIOUS TRACING       : 03/25/2018 17.19 Inferior wall myocardial infarction, but largely unchanged 
from prior tracing.

 

DOCTOR:   Aleksander Solis  Interpretating Date/Time  03/27/2018 16:08:51